# Patient Record
Sex: FEMALE | Race: WHITE | NOT HISPANIC OR LATINO | Employment: STUDENT | URBAN - METROPOLITAN AREA
[De-identification: names, ages, dates, MRNs, and addresses within clinical notes are randomized per-mention and may not be internally consistent; named-entity substitution may affect disease eponyms.]

---

## 2022-01-10 ENCOUNTER — TRANSFERRED RECORDS (OUTPATIENT)
Dept: HEALTH INFORMATION MANAGEMENT | Facility: CLINIC | Age: 22
End: 2022-01-10
Payer: COMMERCIAL

## 2022-02-07 ENCOUNTER — ANCILLARY PROCEDURE (OUTPATIENT)
Dept: ULTRASOUND IMAGING | Facility: CLINIC | Age: 22
End: 2022-02-07
Payer: COMMERCIAL

## 2022-02-07 DIAGNOSIS — R22.1 LOCALIZED SWELLING, MASS AND LUMP, NECK: ICD-10-CM

## 2022-02-07 PROCEDURE — 76536 US EXAM OF HEAD AND NECK: CPT | Mod: GC | Performed by: RADIOLOGY

## 2022-02-14 ENCOUNTER — ANCILLARY PROCEDURE (OUTPATIENT)
Dept: CT IMAGING | Facility: CLINIC | Age: 22
End: 2022-02-14
Attending: NURSE PRACTITIONER
Payer: COMMERCIAL

## 2022-02-14 DIAGNOSIS — R22.1 NECK MASS: ICD-10-CM

## 2022-02-14 PROCEDURE — 70491 CT SOFT TISSUE NECK W/DYE: CPT | Mod: GC | Performed by: RADIOLOGY

## 2022-02-14 RX ORDER — IOPAMIDOL 755 MG/ML
100 INJECTION, SOLUTION INTRAVASCULAR ONCE
Status: COMPLETED | OUTPATIENT
Start: 2022-02-14 | End: 2022-02-14

## 2022-02-14 RX ADMIN — IOPAMIDOL 100 ML: 755 INJECTION, SOLUTION INTRAVASCULAR at 16:43

## 2022-02-18 ENCOUNTER — ANCILLARY PROCEDURE (OUTPATIENT)
Dept: ULTRASOUND IMAGING | Facility: CLINIC | Age: 22
End: 2022-02-18
Attending: NURSE PRACTITIONER
Payer: COMMERCIAL

## 2022-02-18 DIAGNOSIS — E04.1 THYROID NODULE: ICD-10-CM

## 2022-02-18 PROCEDURE — 76536 US EXAM OF HEAD AND NECK: CPT | Performed by: RADIOLOGY

## 2022-02-22 ENCOUNTER — MEDICAL CORRESPONDENCE (OUTPATIENT)
Dept: HEALTH INFORMATION MANAGEMENT | Facility: CLINIC | Age: 22
End: 2022-02-22
Payer: COMMERCIAL

## 2022-03-03 ENCOUNTER — TELEPHONE (OUTPATIENT)
Dept: ENDOCRINOLOGY | Facility: CLINIC | Age: 22
End: 2022-03-03
Payer: COMMERCIAL

## 2022-03-03 NOTE — TELEPHONE ENCOUNTER
Can she  Be put in Dr clark Thyroid nodule clinic  In person  Earlier than June ? Nataliya Nieves RN on 3/3/2022 at 12:36 PM

## 2022-03-03 NOTE — TELEPHONE ENCOUNTER
Health Call Center    Phone Message    May a detailed message be left on voicemail: yes     Reason for Call: Other: Patient is being referred to be seen for Thyroid nodule. Ref by Caryn Mclena with [facility]. Patient is currently scheduled on 6/9/22 at 8:00am with Dr Mobley. Patient wants to be seen in-person. Appointment is outside th 2 week time frame per guidelines. Sending encounter to clinic for review and scheduling. Please call patient to schedule if sooner opening is needed. Of note, patient had imaging done within [facility].     Action Taken: Message routed to:  [facility] Endocrinology    Travel Screening: Not Applicable

## 2022-04-03 ENCOUNTER — HEALTH MAINTENANCE LETTER (OUTPATIENT)
Age: 22
End: 2022-04-03

## 2022-05-15 ASSESSMENT — ENCOUNTER SYMPTOMS
DECREASED CONCENTRATION: 0
ALTERED TEMPERATURE REGULATION: 1
DEPRESSION: 0
CHILLS: 0
FATIGUE: 1
POLYPHAGIA: 0
FEVER: 0
INCREASED ENERGY: 1
NIGHT SWEATS: 0
WEIGHT GAIN: 0
WEIGHT LOSS: 0
INSOMNIA: 0
HALLUCINATIONS: 0
NERVOUS/ANXIOUS: 1
PANIC: 0
POLYDIPSIA: 0
DECREASED APPETITE: 0

## 2022-05-15 NOTE — PROGRESS NOTES
Thyroid biopsy  Caitlyn Owen CMA    Dorota Tellez is a 22 year old female who is being evaluated via a billable video visit.        Endocrinology and Diabetes Clinic    Consulting provider: Caryn Mclean NP  [place] [phone]    Reason for consultation: Multinodular goiter    HPI:   Dorota Tellez is a 22 year old female with finding of thyroid nodule on CT scan done for lymphadenopathy in 2/14/2022, which was done after atypical finding in a soft tissue US of a atypical mass. .    No suspicious masses were seen on subsequent CT scan in the soft tissue of the neck      Patient concern:thyroid nodles      NL TSH in 2019    Thyroid medications: none    Symptoms of hypo- hyperthyroidism:    Exposure to radiation: no  FH of thyroid Cancer: no      Assessment:  Non-toxic multinodular goiter  FNA of 1.2cm hypoechoic nodule indicated for hypoechoic pattern and size.  Patient is without risk factors and asymptomatic  Potential outcomes including benign thyroid cancer, hypocellular and atypical were reviewed      Plan:   Fine-needle aspiration of hypoechoic nodule in right lobe    Desiree Mullins MD  Endocrinology and Diabetes  Telephone contact:  [facility] [place] [phone]  [facility] [phone]        Past Medical and Past Surgical History:  No past medical history on file.    No past surgical history on file.    Medications:   No current outpatient medications on file.       Allergies:   No Known Allergies    Social History     Tobacco Use     Smoking status: Not on file     Smokeless tobacco: Not on file   Substance Use Topics     Alcohol use: Not on file       Physical Examination:  Physical Examination:  There were no vitals taken for this visit.  There is no height or weight on file to calculate BMI.    Wt Readings from Last 4 Encounters:  No data found for Wt      General: Well appearing woman in no distress, up  and go quick  Eyes: EOMI. Sclerae and conjunctivae are clear.   HENT: No thyromegaly or mass.  Nodule could not be appreciated on exam  Lymphatic: No cervical or supraclavicular lymphadenopathy.  Cardiovascular: RRR, with normal S1+S2 and no murmurs.      Labs and Studies:   No results found for: NA, CO2, CHLORIDE, CR, TRIG, HDL, HGB, TSH      Results for orders placed in visit on 02/18/22    US Thyroid    Narrative  US THYROID 2/18/2022 8:57 AM    COMPARISON: CT 2/14/2022    HISTORY: Thyroid nodule    FINDINGS:  Thyroid parenchyma: heterogenous  The right lobe of the thyroid measures: 5.7 x 2.0 x 2.0 cm  The left lobe of the thyroid measures: 4.9 x 2.0 x 1.6 cm  The thyroid isthmus measures: 0.3 cm    Right Lobe:  Nodule 1:  Location: Superior  Size: 1.0 x 0.7 x 0.5 cm  Composition: Solid or almost completely solid (2 points)  Echogenicity: Hypoechoic (2 points)  Shape: Wider than tall (0 points)  Margin: Smooth (0 points)  Echogenic Foci: None or large comet tail artifact (0 points)  Stability: Not previously imaged  TIRADS: TR4 (4-6 points)    Nodule 2:  Location: Mid  Size: 2.0 x 1.3 x 0.6 cm  Composition: Solid or almost completely solid (2 points)  Echogenicity: Hypoechoic (2 points)  Shape: Wider than tall (0 points)  Margin: Smooth (0 points)  Echogenic Foci: None or large comet tail artifact (0 points)  Stability: Not previously imaged  TIRADS: TR4 (4-6 points)    Nodule 3/4:  Inferior to the right thyroid gland hypoechoic nodules measuring 1.2 x  0.9 x 0.7 cm and 0.9 x 0.8 x 0.8 cm, likely lymph nodes.    Left Lobe:    Nodule 5:  Posterior to the left thyroid gland is a 1.2 x 0.9 x 0.5 cm hypoechoic  nodule likely representing a lymph node versus parathyroid gland.    Nodule 6/7:  Inferior to the left thyroid gland are hypoechoic nodules measuring  1.4 x 1.2 x 0.7 cm and 1.7 x 1.5 x 0.9 cm, likely representing lymph  nodes.    Impression  Impression:  1.  Heterogeneous echotexture of the thyroid gland is  nonspecific, but  can be seen with thyroiditis.  2.  Right thyroid nodules as described above. Fine-needle aspiration  may be considered for nodule #2.  3.  Several hypoechoic nodules around the thyroid likely represent  lymph nodes.    ACR TI-RADS recommendations  TR2 (2 points) & TR1 (0 points) -No FNA or follow-up  TR3 (3 points) - FNA if ? 2.5cm, follow-up if 1.5 -2.4 cm in 1, 3 and  5 years  TR4 (4-6 points) - FNA if ? 1.5cm, follow-up if 1 -1.4 cm in 1, 2, 3  and 5 years  TR5 (?7 points) - FNA if ? 1cm, follow-up if 0.5 -0.9 cm every year  for 5 years    I have personally reviewed the examination and initial interpretation  and I agree with the findings.    EMMIE OSCAR MD    Results for orders placed in visit on 02/14/22    CT Soft Tissue Neck w Contrast    Narrative  CT SOFT TISSUE NECK W CONTRAST 2/14/2022 4:55 PM    History:  lymphadenopathy; Neck mass  ICD-10: Neck mass    Comparison:  None    Technique: Following intravenous administration of nonionic iodinated  contrast medium, thin section helical CT images were obtained from the  skull base down to the level of the aortic arch.  Axial, coronal and  sagittal reformations were performed with 2-3 mm slice thickness  reconstruction. Images were reviewed in soft tissue, lung and bone  windows.    Contrast: Isovue 370  100cc    Findings:  No suspicious mass in the area of marker. There is a normal-sized left  submandibular node. Additional scattered normal-sized cervical lymph  nodes.  Evaluation of the mucosal space demonstrates no evident abnormality in  the nasopharynx, oropharynx, hypopharynx or the glottis. The tongue  base appears normal.  The major salivary glands appear unremarkable.  Multinodular thyroid gland is noted.  The major vascular structures in the neck appear unremarkable.    Evaluation of the osseous structures demonstrate no worrisome lytic or  sclerotic lesion. . No overt spinal canal or neuroforaminal stenosis.  Mild polypoid mucosal  thickening of the right maxillary sinus. The  remainder of the visualized paranasal sinuses are clear.. The  visualized mastoid air cells are clear.. Orbits appear unremarkable.    The visualized lung apices are clear.    Impression  Impression:  1. Multinodular thyroid gland. Further evaluation with ultrasound is  recommended.    2. No suspicious mass in the neck.    I have personally reviewed the examination and initial interpretation  and I agree with the findings.    CANDICE CUI MD      SYSTEM ID:  [id number]        Answers for HPI/ROS submitted by the patient on 5/15/2022  General Symptoms: Yes  Skin Symptoms: No  HENT Symptoms: No  EYE SYMPTOMS: No  HEART SYMPTOMS: No  LUNG SYMPTOMS: No  INTESTINAL SYMPTOMS: No  URINARY SYMPTOMS: No  GYNECOLOGIC SYMPTOMS: No  BREAST SYMPTOMS: No  SKELETAL SYMPTOMS: No  BLOOD SYMPTOMS: No  NERVOUS SYSTEM SYMPTOMS: No  MENTAL HEALTH SYMPTOMS: Yes  Fever: No  Loss of appetite: No  Weight loss: No  Weight gain: No  Fatigue: Yes  Night sweats: No  Chills: No  Increased stress: Yes  Excessive hunger: No  Excessive thirst: No  Feeling hot or cold when others believe the temperature is normal: Yes  Loss of height: No  Post-operative complications: No  Surgical site pain: No  Hallucinations: No  Change in or Loss of Energy: Yes  Hyperactivity: No  Confusion: No  Nervous or Anxious: Yes  Depression: No  Trouble sleeping: No  Trouble thinking or concentrating: No  Mood changes: No  Panic attacks: No

## 2022-05-18 ENCOUNTER — OFFICE VISIT (OUTPATIENT)
Dept: ENDOCRINOLOGY | Facility: CLINIC | Age: 22
End: 2022-05-18
Payer: COMMERCIAL

## 2022-05-18 VITALS
SYSTOLIC BLOOD PRESSURE: 107 MMHG | DIASTOLIC BLOOD PRESSURE: 73 MMHG | TEMPERATURE: 98.6 F | HEART RATE: 87 BPM | BODY MASS INDEX: 21.39 KG/M2 | WEIGHT: 128.4 LBS | HEIGHT: 65 IN

## 2022-05-18 DIAGNOSIS — E04.2 NON-TOXIC MULTINODULAR GOITER: Primary | ICD-10-CM

## 2022-05-18 PROCEDURE — 88173 CYTOPATH EVAL FNA REPORT: CPT | Mod: 26 | Performed by: PATHOLOGY

## 2022-05-18 PROCEDURE — 88173 CYTOPATH EVAL FNA REPORT: CPT | Mod: TC | Performed by: INTERNAL MEDICINE

## 2022-05-18 PROCEDURE — 99203 OFFICE O/P NEW LOW 30 MIN: CPT | Performed by: INTERNAL MEDICINE

## 2022-05-18 RX ORDER — NORGESTREL-ETHINYL ESTRADIOL 0.3-0.03MG
TABLET ORAL
COMMUNITY
Start: 2022-04-14

## 2022-05-18 RX ORDER — LAMOTRIGINE 25 MG/1
75 TABLET ORAL
COMMUNITY
Start: 2022-01-10

## 2022-05-18 RX ORDER — AMLODIPINE BESYLATE 5 MG/1
5 TABLET ORAL
COMMUNITY
Start: 2022-01-10

## 2022-05-18 RX ORDER — SERTRALINE HYDROCHLORIDE 100 MG/1
100 TABLET, FILM COATED ORAL
COMMUNITY
Start: 2022-01-10

## 2022-05-18 RX ORDER — FERROUS SULFATE 325(65) MG
325 TABLET ORAL
COMMUNITY

## 2022-05-18 RX ORDER — CEFUROXIME AXETIL 500 MG/1
250 TABLET ORAL
COMMUNITY
Start: 2022-01-10

## 2022-05-18 RX ORDER — CLINDAMYCIN PHOSPHATE 10 MG/ML
1 SOLUTION TOPICAL
COMMUNITY
Start: 2020-08-10

## 2022-05-18 ASSESSMENT — PAIN SCALES - GENERAL: PAINLEVEL: NO PAIN (0)

## 2022-05-18 NOTE — PROCEDURES
US guided FNAB protocol  Indication: 1.2 cm hypoechoic nodule mid right lobe    Referring physician: Dr. Caryn Mclean    Following informed consent, pause for the cause and the sterile preparation, FNA biopsy was performed using 25 gauge needles and ultrasound guidance for needle placement. Total of 4 passes were made by  Gaetano  . Samples were submitted for cytology, 1/2 air dried and 1/2 in 95% ETOH and the needle wash without complications.  Sample for Afirma was obtained.  After care instructions were provided.     Impression: uncomplicated fine needle aspiration biopsy of right thyroid nodule under ultrasound guidance    Patient was discharged home, there was no bleed, no bruits    Operators: Desiree Mullins MD  Endocrinology and Diabetes  Telephone contact:  [facility] [place] [phone]  [facility] [phone]

## 2022-05-18 NOTE — LETTER
5/18/2022       RE: Dorota Tellez  [address] [facility] [address]     Dear Colleague,    Thank you for referring your patient, Dorota Tellez, to the [facility] [place] at [facility]. Please see a copy of my visit note below.    Thyroid biopsy  Caitlyn Owen CMA    Dorota Tellez is a 22 year old female who is being evaluated via a billable video visit.        Endocrinology and Diabetes Clinic    Consulting provider: Caryn Mclean NP  [facility]  [address] [place] [address]    Reason for consultation: Multinodular goiter    HPI:   Dorota Tellez is a 22 year old female with finding of thyroid nodule on CT scan done for lymphadenopathy in 2/14/2022, which was done after atypical finding in a soft tissue US of a atypical mass. .    No suspicious masses were seen on subsequent CT scan in the soft tissue of the neck      Patient concern:thyroid nodles      NL TSH in 2019    Thyroid medications: none    Symptoms of hypo- hyperthyroidism:    Exposure to radiation: no  FH of thyroid Cancer: no      Assessment:  Non-toxic multinodular goiter  FNA of 1.2cm hypoechoic nodule indicated for hypoechoic pattern and size.  Patient is without risk factors and asymptomatic  Potential outcomes including benign thyroid cancer, hypocellular and atypical were reviewed      Plan:   Fine-needle aspiration of hypoechoic nodule in right lobe    Desiree Mullins MD  Endocrinology and Diabetes  Telephone contact:  [facility] [place] [phone]  [facility] [phone]        Past Medical and Past Surgical History:  No past medical history on file.    No past surgical history on file.    Medications:   No current outpatient medications on file.       Allergies:   No Known Allergies    Social History     Tobacco Use     Smoking status: Not on file     Smokeless tobacco: Not on file    Substance Use Topics     Alcohol use: Not on file       Physical Examination:  Physical Examination:  There were no vitals taken for this visit.  There is no height or weight on file to calculate BMI.    Wt Readings from Last 4 Encounters:  No data found for Wt      General: Well appearing woman in no distress, up and go quick  Eyes: EOMI. Sclerae and conjunctivae are clear.   HENT: No thyromegaly or mass.  Nodule could not be appreciated on exam  Lymphatic: No cervical or supraclavicular lymphadenopathy.  Cardiovascular: RRR, with normal S1+S2 and no murmurs.      Labs and Studies:   No results found for: NA, CO2, CHLORIDE, CR, TRIG, HDL, HGB, TSH      Results for orders placed in visit on 02/18/22    US Thyroid    Narrative  US THYROID 2/18/2022 8:57 AM    COMPARISON: CT 2/14/2022    HISTORY: Thyroid nodule    FINDINGS:  Thyroid parenchyma: heterogenous  The right lobe of the thyroid measures: 5.7 x 2.0 x 2.0 cm  The left lobe of the thyroid measures: 4.9 x 2.0 x 1.6 cm  The thyroid isthmus measures: 0.3 cm    Right Lobe:  Nodule 1:  Location: Superior  Size: 1.0 x 0.7 x 0.5 cm  Composition: Solid or almost completely solid (2 points)  Echogenicity: Hypoechoic (2 points)  Shape: Wider than tall (0 points)  Margin: Smooth (0 points)  Echogenic Foci: None or large comet tail artifact (0 points)  Stability: Not previously imaged  TIRADS: TR4 (4-6 points)    Nodule 2:  Location: Mid  Size: 2.0 x 1.3 x 0.6 cm  Composition: Solid or almost completely solid (2 points)  Echogenicity: Hypoechoic (2 points)  Shape: Wider than tall (0 points)  Margin: Smooth (0 points)  Echogenic Foci: None or large comet tail artifact (0 points)  Stability: Not previously imaged  TIRADS: TR4 (4-6 points)    Nodule 3/4:  Inferior to the right thyroid gland hypoechoic nodules measuring 1.2 x  0.9 x 0.7 cm and 0.9 x 0.8 x 0.8 cm, likely lymph nodes.    Left Lobe:    Nodule 5:  Posterior to the left thyroid gland is a 1.2 x 0.9 x 0.5 cm  hypoechoic  nodule likely representing a lymph node versus parathyroid gland.    Nodule 6/7:  Inferior to the left thyroid gland are hypoechoic nodules measuring  1.4 x 1.2 x 0.7 cm and 1.7 x 1.5 x 0.9 cm, likely representing lymph  nodes.    Impression  Impression:  1.  Heterogeneous echotexture of the thyroid gland is nonspecific, but  can be seen with thyroiditis.  2.  Right thyroid nodules as described above. Fine-needle aspiration  may be considered for nodule #2.  3.  Several hypoechoic nodules around the thyroid likely represent  lymph nodes.    ACR TI-RADS recommendations  TR2 (2 points) & TR1 (0 points) -No FNA or follow-up  TR3 (3 points) - FNA if ? 2.5cm, follow-up if 1.5 -2.4 cm in 1, 3 and  5 years  TR4 (4-6 points) - FNA if ? 1.5cm, follow-up if 1 -1.4 cm in 1, 2, 3  and 5 years  TR5 (?7 points) - FNA if ? 1cm, follow-up if 0.5 -0.9 cm every year  for 5 years    I have personally reviewed the examination and initial interpretation  and I agree with the findings.    EMMIE OSCAR MD    Results for orders placed in visit on 02/14/22    CT Soft Tissue Neck w Contrast    Narrative  CT SOFT TISSUE NECK W CONTRAST 2/14/2022 4:55 PM    History:  lymphadenopathy; Neck mass  ICD-10: Neck mass    Comparison:  None    Technique: Following intravenous administration of nonionic iodinated  contrast medium, thin section helical CT images were obtained from the  skull base down to the level of the aortic arch.  Axial, coronal and  sagittal reformations were performed with 2-3 mm slice thickness  reconstruction. Images were reviewed in soft tissue, lung and bone  windows.    Contrast: Isovue 370  100cc    Findings:  No suspicious mass in the area of marker. There is a normal-sized left  submandibular node. Additional scattered normal-sized cervical lymph  nodes.  Evaluation of the mucosal space demonstrates no evident abnormality in  the nasopharynx, oropharynx, hypopharynx or the glottis. The tongue  base appears  normal.  The major salivary glands appear unremarkable.  Multinodular thyroid gland is noted.  The major vascular structures in the neck appear unremarkable.    Evaluation of the osseous structures demonstrate no worrisome lytic or  sclerotic lesion. . No overt spinal canal or neuroforaminal stenosis.  Mild polypoid mucosal thickening of the right maxillary sinus. The  remainder of the visualized paranasal sinuses are clear.. The  visualized mastoid air cells are clear.. Orbits appear unremarkable.    The visualized lung apices are clear.    Impression  Impression:  1. Multinodular thyroid gland. Further evaluation with ultrasound is  recommended.    2. No suspicious mass in the neck.    I have personally reviewed the examination and initial interpretation  and I agree with the findings.    CANDICE CUI MD      SYSTEM ID:  [id number]        Answers for HPI/ROS submitted by the patient on 5/15/2022  General Symptoms: Yes  Skin Symptoms: No  HENT Symptoms: No  EYE SYMPTOMS: No  HEART SYMPTOMS: No  LUNG SYMPTOMS: No  INTESTINAL SYMPTOMS: No  URINARY SYMPTOMS: No  GYNECOLOGIC SYMPTOMS: No  BREAST SYMPTOMS: No  SKELETAL SYMPTOMS: No  BLOOD SYMPTOMS: No  NERVOUS SYSTEM SYMPTOMS: No  MENTAL HEALTH SYMPTOMS: Yes  Fever: No  Loss of appetite: No  Weight loss: No  Weight gain: No  Fatigue: Yes  Night sweats: No  Chills: No  Increased stress: Yes  Excessive hunger: No  Excessive thirst: No  Feeling hot or cold when others believe the temperature is normal: Yes  Loss of height: No  Post-operative complications: No  Surgical site pain: No  Hallucinations: No  Change in or Loss of Energy: Yes  Hyperactivity: No  Confusion: No  Nervous or Anxious: Yes  Depression: No  Trouble sleeping: No  Trouble thinking or concentrating: No  Mood changes: No  Panic attacks: No

## 2022-05-19 LAB
PATH REPORT.COMMENTS IMP SPEC: NORMAL
PATH REPORT.COMMENTS IMP SPEC: NORMAL
PATH REPORT.FINAL DX SPEC: NORMAL
PATH REPORT.GROSS SPEC: NORMAL
PATH REPORT.MICROSCOPIC SPEC OTHER STN: NORMAL
PATH REPORT.RELEVANT HX SPEC: NORMAL

## 2022-10-03 ENCOUNTER — HEALTH MAINTENANCE LETTER (OUTPATIENT)
Age: 22
End: 2022-10-03

## 2023-02-12 ENCOUNTER — HEALTH MAINTENANCE LETTER (OUTPATIENT)
Age: 23
End: 2023-02-12

## 2023-06-21 ENCOUNTER — OFFICE VISIT (OUTPATIENT)
Dept: ENDOCRINOLOGY | Facility: CLINIC | Age: 23
End: 2023-06-21
Payer: COMMERCIAL

## 2023-06-21 VITALS
HEART RATE: 75 BPM | SYSTOLIC BLOOD PRESSURE: 99 MMHG | OXYGEN SATURATION: 97 % | DIASTOLIC BLOOD PRESSURE: 61 MMHG | WEIGHT: 137 LBS | BODY MASS INDEX: 22.8 KG/M2

## 2023-06-21 DIAGNOSIS — E04.2 NON-TOXIC MULTINODULAR GOITER: Primary | ICD-10-CM

## 2023-06-21 PROCEDURE — 99214 OFFICE O/P EST MOD 30 MIN: CPT | Performed by: INTERNAL MEDICINE

## 2023-06-21 ASSESSMENT — PAIN SCALES - GENERAL: PAINLEVEL: NO PAIN (0)

## 2023-06-21 NOTE — LETTER
6/21/2023       RE: Dorota Tellez  [address] [facility] [address]     Dear Colleague,    Thank you for referring your patient, Dorota Tellez, to the [facility] ENDOCRINOLOGY CLINIC [place] at [facility]. Please see a copy of my visit note below.    Endocrinology and Diabetes Clinic      Follow-up for nontoxic multinodular goiter      Assessment:  Non-toxic multinodular goiter  FNA of 1.2cm hypoechoic nodule shows Hashimoto's thyroiditis   Patient is without risk factors and asymptomatic  Patient is clinically euthyroid  Symptoms of hypo and hyperthyroidism were reviewed, importance of checking thyroid hormone functions during pregnancy or even during pregnancy planning was discussed    Plan:   Thyroid ultrasound repeat soon  Check TSH reflex free T4 once a year or with symptoms    Desiree Mullins MD  Endocrinology and Diabetes  Telephone contact:  [facility] [place] [phone]  [facility] [phone]          Interval history  Patient remains without symptoms in the anterior neck including no dysphagia dysphonia no pain    Patient notes mild weight gain, however otherwise denies any symptoms including heart racing palpitations tremors change in bowel habits hair loss                  Past Medical and Past Surgical History:  No past medical history on file.    No past surgical history on file.    Medications:   Current Outpatient Medications   Medication Sig Dispense Refill    amLODIPine (NORVASC) 5 MG tablet Take 5 mg by mouth      cefuroxime (CEFTIN) 500 MG tablet Take 250 mg by mouth      clindamycin (CLEOCIN T) 1 % SWAB Apply 1 Dose topically      CRYSELLE-28 0.3-30 MG-MCG tablet       ferrous sulfate (FEROSUL) 325 (65 Fe) MG tablet Take 325 mg by mouth      lamoTRIgine (LAMICTAL) 25 MG tablet Take 75 mg by mouth      norgestrel-ethinyl estradiol (LO/OVRAL) 0.3-30 MG-MCG tablet        sertraline (ZOLOFT) 100 MG tablet Take 100 mg by mouth         Allergies:   No Known Allergies    Social History     Tobacco Use    Smoking status: Never    Smokeless tobacco: Never   Substance Use Topics    Alcohol use: Yes     Comment: Occassionally       Physical Examination:  Physical Examination:  There were no vitals taken for this visit.  There is no height or weight on file to calculate BMI.    Wt Readings from Last 4 Encounters:  No data found for Wt      General: Well appearing woman in no distress, up and go quick  Eyes: EOMI. Sclerae and conjunctivae are clear.   HENT: No thyromegaly or mass.  Nodule could not be appreciated on exam  Lymphatic: No cervical or supraclavicular lymphadenopathy.  Cardiovascular: RRR, with normal S1+S2 and no murmurs.      Labs and Studies:         No results found for: NA, CO2, CHLORIDE, CR, TRIG, HDL, HGB, TSH          US Thyroid    Narrative  US THYROID 2/18/2022 8:57 AM    COMPARISON: CT 2/14/2022    HISTORY: Thyroid nodule    FINDINGS:  Thyroid parenchyma: heterogenous  The right lobe of the thyroid measures: 5.7 x 2.0 x 2.0 cm  The left lobe of the thyroid measures: 4.9 x 2.0 x 1.6 cm  The thyroid isthmus measures: 0.3 cm    Right Lobe:  Nodule 1:  Location: Superior  Size: 1.0 x 0.7 x 0.5 cm  Composition: Solid or almost completely solid (2 points)  Echogenicity: Hypoechoic (2 points)  Shape: Wider than tall (0 points)  Margin: Smooth (0 points)  Echogenic Foci: None or large comet tail artifact (0 points)  Stability: Not previously imaged  TIRADS: TR4 (4-6 points)    Nodule 2:  Location: Mid  Size: 2.0 x 1.3 x 0.6 cm  Composition: Solid or almost completely solid (2 points)  Echogenicity: Hypoechoic (2 points)  Shape: Wider than tall (0 points)  Margin: Smooth (0 points)  Echogenic Foci: None or large comet tail artifact (0 points)  Stability: Not previously imaged  TIRADS: TR4 (4-6 points)    Nodule 3/4:  Inferior to the right thyroid gland hypoechoic nodules  measuring 1.2 x  0.9 x 0.7 cm and 0.9 x 0.8 x 0.8 cm, likely lymph nodes.    Left Lobe:    Nodule 5:  Posterior to the left thyroid gland is a 1.2 x 0.9 x 0.5 cm hypoechoic  nodule likely representing a lymph node versus parathyroid gland.    Nodule 6/7:  Inferior to the left thyroid gland are hypoechoic nodules measuring  1.4 x 1.2 x 0.7 cm and 1.7 x 1.5 x 0.9 cm, likely representing lymph  nodes.    Impression  Impression:  1.  Heterogeneous echotexture of the thyroid gland is nonspecific, but  can be seen with thyroiditis.  2.  Right thyroid nodules as described above. Fine-needle aspiration  may be considered for nodule #2.  3.  Several hypoechoic nodules around the thyroid likely represent  lymph nodes.    ACR TI-RADS recommendations  TR2 (2 points) & TR1 (0 points) -No FNA or follow-up  TR3 (3 points) - FNA if ? 2.5cm, follow-up if 1.5 -2.4 cm in 1, 3 and  5 years  TR4 (4-6 points) - FNA if ? 1.5cm, follow-up if 1 -1.4 cm in 1, 2, 3  and 5 years  TR5 (?7 points) - FNA if ? 1cm, follow-up if 0.5 -0.9 cm every year  for 5 years    I have personally reviewed the examination and initial interpretation  and I agree with the findings.    EMMIE OSCAR MD    Results for orders placed in visit on 02/14/22    CT Soft Tissue Neck w Contrast    Narrative  CT SOFT TISSUE NECK W CONTRAST 2/14/2022 4:55 PM    History:  lymphadenopathy; Neck mass  ICD-10: Neck mass    Comparison:  None    Technique: Following intravenous administration of nonionic iodinated  contrast medium, thin section helical CT images were obtained from the  skull base down to the level of the aortic arch.  Axial, coronal and  sagittal reformations were performed with 2-3 mm slice thickness  reconstruction. Images were reviewed in soft tissue, lung and bone  windows.    Contrast: Isovue 370  100cc    Findings:  No suspicious mass in the area of marker. There is a normal-sized left  submandibular node. Additional scattered normal-sized cervical  lymph  nodes.  Evaluation of the mucosal space demonstrates no evident abnormality in  the nasopharynx, oropharynx, hypopharynx or the glottis. The tongue  base appears normal.  The major salivary glands appear unremarkable.  Multinodular thyroid gland is noted.  The major vascular structures in the neck appear unremarkable.    Evaluation of the osseous structures demonstrate no worrisome lytic or  sclerotic lesion. . No overt spinal canal or neuroforaminal stenosis.  Mild polypoid mucosal thickening of the right maxillary sinus. The  remainder of the visualized paranasal sinuses are clear.. The  visualized mastoid air cells are clear.. Orbits appear unremarkable.    The visualized lung apices are clear.    Impression  Impression:  1. Multinodular thyroid gland. Further evaluation with ultrasound is  recommended.    2. No suspicious mass in the neck.    I have personally reviewed the examination and initial interpretation  and I agree with the findings.    CANDICE CUI MD      SYSTEM ID:  [id number]      HPI:   Dorota Tellez is a young female with finding of thyroid nodule on CT scan done for lymphadenopathy in 2/14/2022, which was done after atypical finding in a soft tissue US of a atypical mass. .    No suspicious masses were seen on subsequent CT scan in the soft tissue of the neck    Thyroid medications: none    Exposure to radiation: no  FH of thyroid Cancer: no

## 2023-06-21 NOTE — PROGRESS NOTES
[facility]      Follow-up for nontoxic multinodular goiter      Assessment:  Non-toxic multinodular goiter  FNA of 1.2cm hypoechoic nodule shows Hashimoto's thyroiditis   Patient is without risk factors and asymptomatic  Patient is clinically euthyroid  Symptoms of hypo and hyperthyroidism were reviewed, importance of checking thyroid hormone functions during pregnancy or even during pregnancy planning was discussed    Plan:   Thyroid ultrasound repeat soon  Check TSH reflex free T4 once a year or with symptoms    Desiree Mullins MD  Endocrinology and Diabetes  Telephone contact:  [facility] [phone]  [facility] [phone]          Interval history  Patient remains without symptoms in the anterior neck including no dysphagia dysphonia no pain    Patient notes mild weight gain, however otherwise denies any symptoms including heart racing palpitations tremors change in bowel habits hair loss                  Past Medical and Past Surgical History:  No past medical history on file.    No past surgical history on file.    Medications:   Current Outpatient Medications   Medication Sig Dispense Refill     amLODIPine (NORVASC) 5 MG tablet Take 5 mg by mouth       cefuroxime (CEFTIN) 500 MG tablet Take 250 mg by mouth       clindamycin (CLEOCIN T) 1 % SWAB Apply 1 Dose topically       CRYSELLE-28 0.3-30 MG-MCG tablet        ferrous sulfate (FEROSUL) 325 (65 Fe) MG tablet Take 325 mg by mouth       lamoTRIgine (LAMICTAL) 25 MG tablet Take 75 mg by mouth       norgestrel-ethinyl estradiol (LO/OVRAL) 0.3-30 MG-MCG tablet        sertraline (ZOLOFT) 100 MG tablet Take 100 mg by mouth         Allergies:   No Known Allergies    Social History     Tobacco Use     Smoking status: Never     Smokeless tobacco: Never   Substance Use Topics     Alcohol use: Yes     Comment: Occassionally       Physical Examination:  Physical Examination:  There were no  vitals taken for this visit.  There is no height or weight on file to calculate BMI.    Wt Readings from Last 4 Encounters:  No data found for Wt      General: Well appearing woman in no distress, up and go quick  Eyes: EOMI. Sclerae and conjunctivae are clear.   HENT: No thyromegaly or mass.  Nodule could not be appreciated on exam  Lymphatic: No cervical or supraclavicular lymphadenopathy.  Cardiovascular: RRR, with normal S1+S2 and no murmurs.      Labs and Studies:         No results found for: NA, CO2, CHLORIDE, CR, TRIG, HDL, HGB, TSH          US Thyroid    Narrative  US THYROID 2/18/2022 8:57 AM    COMPARISON: CT 2/14/2022    HISTORY: Thyroid nodule    FINDINGS:  Thyroid parenchyma: heterogenous  The right lobe of the thyroid measures: 5.7 x 2.0 x 2.0 cm  The left lobe of the thyroid measures: 4.9 x 2.0 x 1.6 cm  The thyroid isthmus measures: 0.3 cm    Right Lobe:  Nodule 1:  Location: Superior  Size: 1.0 x 0.7 x 0.5 cm  Composition: Solid or almost completely solid (2 points)  Echogenicity: Hypoechoic (2 points)  Shape: Wider than tall (0 points)  Margin: Smooth (0 points)  Echogenic Foci: None or large comet tail artifact (0 points)  Stability: Not previously imaged  TIRADS: TR4 (4-6 points)    Nodule 2:  Location: Mid  Size: 2.0 x 1.3 x 0.6 cm  Composition: Solid or almost completely solid (2 points)  Echogenicity: Hypoechoic (2 points)  Shape: Wider than tall (0 points)  Margin: Smooth (0 points)  Echogenic Foci: None or large comet tail artifact (0 points)  Stability: Not previously imaged  TIRADS: TR4 (4-6 points)    Nodule 3/4:  Inferior to the right thyroid gland hypoechoic nodules measuring 1.2 x  0.9 x 0.7 cm and 0.9 x 0.8 x 0.8 cm, likely lymph nodes.    Left Lobe:    Nodule 5:  Posterior to the left thyroid gland is a 1.2 x 0.9 x 0.5 cm hypoechoic  nodule likely representing a lymph node versus parathyroid gland.    Nodule 6/7:  Inferior to the left thyroid gland are hypoechoic nodules  measuring  1.4 x 1.2 x 0.7 cm and 1.7 x 1.5 x 0.9 cm, likely representing lymph  nodes.    Impression  Impression:  1.  Heterogeneous echotexture of the thyroid gland is nonspecific, but  can be seen with thyroiditis.  2.  Right thyroid nodules as described above. Fine-needle aspiration  may be considered for nodule #2.  3.  Several hypoechoic nodules around the thyroid likely represent  lymph nodes.    ACR TI-RADS recommendations  TR2 (2 points) & TR1 (0 points) -No FNA or follow-up  TR3 (3 points) - FNA if ? 2.5cm, follow-up if 1.5 -2.4 cm in 1, 3 and  5 years  TR4 (4-6 points) - FNA if ? 1.5cm, follow-up if 1 -1.4 cm in 1, 2, 3  and 5 years  TR5 (?7 points) - FNA if ? 1cm, follow-up if 0.5 -0.9 cm every year  for 5 years    I have personally reviewed the examination and initial interpretation  and I agree with the findings.    EMMIE OSCAR MD    Results for orders placed in visit on 02/14/22    CT Soft Tissue Neck w Contrast    Narrative  CT SOFT TISSUE NECK W CONTRAST 2/14/2022 4:55 PM    History:  lymphadenopathy; Neck mass  ICD-10: Neck mass    Comparison:  None    Technique: Following intravenous administration of nonionic iodinated  contrast medium, thin section helical CT images were obtained from the  skull base down to the level of the aortic arch.  Axial, coronal and  sagittal reformations were performed with 2-3 mm slice thickness  reconstruction. Images were reviewed in soft tissue, lung and bone  windows.    Contrast: Isovue 370  100cc    Findings:  No suspicious mass in the area of marker. There is a normal-sized left  submandibular node. Additional scattered normal-sized cervical lymph  nodes.  Evaluation of the mucosal space demonstrates no evident abnormality in  the nasopharynx, oropharynx, hypopharynx or the glottis. The tongue  base appears normal.  The major salivary glands appear unremarkable.  Multinodular thyroid gland is noted.  The major vascular structures in the neck appear  unremarkable.    Evaluation of the osseous structures demonstrate no worrisome lytic or  sclerotic lesion. . No overt spinal canal or neuroforaminal stenosis.  Mild polypoid mucosal thickening of the right maxillary sinus. The  remainder of the visualized paranasal sinuses are clear.. The  visualized mastoid air cells are clear.. Orbits appear unremarkable.    The visualized lung apices are clear.    Impression  Impression:  1. Multinodular thyroid gland. Further evaluation with ultrasound is  recommended.    2. No suspicious mass in the neck.    I have personally reviewed the examination and initial interpretation  and I agree with the findings.    CANDICE CUI MD      SYSTEM ID:  [id number]      HPI:   Dorota Tellez is a young female with finding of thyroid nodule on CT scan done for lymphadenopathy in 2/14/2022, which was done after atypical finding in a soft tissue US of a atypical mass. .    No suspicious masses were seen on subsequent CT scan in the soft tissue of the neck    Thyroid medications: none    Exposure to radiation: no  FH of thyroid Cancer: no

## 2023-06-21 NOTE — PATIENT INSTRUCTIONS
Thyroid ultrasound soon      Do lab check for thyroid hormone levels once a year with your annual exam    Please call and schedule at one of these locations that provide the service you need.     DEXA, CT, MRI, US, XRAY    [facility]   ([facility]) [phone]   [facility] ([place], [place]) [phone]   [facility] ([facility]) [phone]   [facility] ([facility]) [phone]

## 2024-01-30 ENCOUNTER — TRANSCRIBE ORDERS (OUTPATIENT)
Dept: OTHER | Age: 24
End: 2024-01-30

## 2024-01-30 DIAGNOSIS — M25.561 ACUTE PAIN OF RIGHT KNEE: Primary | ICD-10-CM

## 2024-02-18 ASSESSMENT — ACTIVITIES OF DAILY LIVING (ADL)
PLEASE_INDICATE_YOR_PRIMARY_REASON_FOR_REFERRAL_TO_THERAPY:: KNEE
WEAKNESS: I DO NOT HAVE THE SYMPTOM
STIFFNESS: THE SYMPTOM AFFECTS MY ACTIVITY SLIGHTLY
GO UP STAIRS: ACTIVITY IS MINIMALLY DIFFICULT
GO DOWN STAIRS: ACTIVITY IS MINIMALLY DIFFICULT
RISE FROM A CHAIR: ACTIVITY IS NOT DIFFICULT
KNEE_ACTIVITY_OF_DAILY_LIVING_SCORE: 88.57
PAIN: THE SYMPTOM AFFECTS MY ACTIVITY SLIGHTLY
SWELLING: I DO NOT HAVE THE SYMPTOM
HOW_WOULD_YOU_RATE_THE_OVERALL_FUNCTION_OF_YOUR_KNEE_DURING_YOUR_USUAL_DAILY_ACTIVITIES?: ABNORMAL
LIMPING: I DO NOT HAVE THE SYMPTOM
GO DOWN STAIRS: ACTIVITY IS MINIMALLY DIFFICULT
WEAKNESS: I DO NOT HAVE THE SYMPTOM
WALK: ACTIVITY IS NOT DIFFICULT
GIVING WAY, BUCKLING OR SHIFTING OF KNEE: I HAVE THE SYMPTOM BUT IT DOES NOT AFFECT MY ACTIVITY
STIFFNESS: THE SYMPTOM AFFECTS MY ACTIVITY SLIGHTLY
WALK: ACTIVITY IS NOT DIFFICULT
HOW_WOULD_YOU_RATE_THE_OVERALL_FUNCTION_OF_YOUR_KNEE_DURING_YOUR_USUAL_DAILY_ACTIVITIES?: ABNORMAL
SIT WITH YOUR KNEE BENT: ACTIVITY IS NOT DIFFICULT
STAND: ACTIVITY IS NOT DIFFICULT
HOW_WOULD_YOU_RATE_THE_CURRENT_FUNCTION_OF_YOUR_KNEE_DURING_YOUR_USUAL_DAILY_ACTIVITIES_ON_A_SCALE_FROM_0_TO_100_WITH_100_BEING_YOUR_LEVEL_OF_KNEE_FUNCTION_PRIOR_TO_YOUR_INJURY_AND_0_BEING_THE_INABILITY_TO_PERFORM_ANY_OF_YOUR_USUAL_DAILY_ACTIVITIES?: 80
AS_A_RESULT_OF_YOUR_KNEE_INJURY,_HOW_WOULD_YOU_RATE_YOUR_CURRENT_LEVEL_OF_DAILY_ACTIVITY?: NEARLY NORMAL
GO UP STAIRS: ACTIVITY IS MINIMALLY DIFFICULT
KNEEL ON THE FRONT OF YOUR KNEE: ACTIVITY IS MINIMALLY DIFFICULT
SWELLING: I DO NOT HAVE THE SYMPTOM
LIMPING: I DO NOT HAVE THE SYMPTOM
SQUAT: ACTIVITY IS NOT DIFFICULT
RISE FROM A CHAIR: ACTIVITY IS NOT DIFFICULT
SIT WITH YOUR KNEE BENT: ACTIVITY IS NOT DIFFICULT
SQUAT: ACTIVITY IS NOT DIFFICULT
KNEEL ON THE FRONT OF YOUR KNEE: ACTIVITY IS MINIMALLY DIFFICULT
AS_A_RESULT_OF_YOUR_KNEE_INJURY,_HOW_WOULD_YOU_RATE_YOUR_CURRENT_LEVEL_OF_DAILY_ACTIVITY?: NEARLY NORMAL
HOW_WOULD_YOU_RATE_THE_CURRENT_FUNCTION_OF_YOUR_KNEE_DURING_YOUR_USUAL_DAILY_ACTIVITIES_ON_A_SCALE_FROM_0_TO_100_WITH_100_BEING_YOUR_LEVEL_OF_KNEE_FUNCTION_PRIOR_TO_YOUR_INJURY_AND_0_BEING_THE_INABILITY_TO_PERFORM_ANY_OF_YOUR_USUAL_DAILY_ACTIVITIES?: 80
RAW_SCORE: 62
STAND: ACTIVITY IS NOT DIFFICULT
PAIN: THE SYMPTOM AFFECTS MY ACTIVITY SLIGHTLY
GIVING WAY, BUCKLING OR SHIFTING OF KNEE: I HAVE THE SYMPTOM BUT IT DOES NOT AFFECT MY ACTIVITY
KNEE_ACTIVITY_OF_DAILY_LIVING_SUM: 62

## 2024-02-23 ENCOUNTER — THERAPY VISIT (OUTPATIENT)
Dept: PHYSICAL THERAPY | Facility: CLINIC | Age: 24
End: 2024-02-23
Payer: COMMERCIAL

## 2024-02-23 DIAGNOSIS — M25.561 ACUTE PAIN OF RIGHT KNEE: ICD-10-CM

## 2024-02-23 PROCEDURE — 97161 PT EVAL LOW COMPLEX 20 MIN: CPT | Mod: GP

## 2024-02-23 PROCEDURE — 97110 THERAPEUTIC EXERCISES: CPT | Mod: GP

## 2024-02-23 NOTE — PROGRESS NOTES
PHYSICAL THERAPY EVALUATION  Type of Visit: Evaluation    See electronic medical record for Abuse and Falls Screening details.    Subjective   Pt is a 25 yo female presenting to PT with R knee pain. Pt states she began to experience discomfort in summer of 2023 with no GRAHAM; around 11/2023 this discomfort turned to pain. Her pain has shifted around her knee; states in the past month it has been moreso on the inside. She states it hurts with sitting, standing, and laying; the pain doesn't get worse or better with rest or exercise. She is able to sleep through the night. No numbness/tingling. Pt has tried ice/heat; takes ibuprofen and a topical NSAID for the pain. Pt had an x-ray done that was unremarkable; is getting an MRI in 2 wks. 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 She would also like to return to her at home exercising 3-4x/wk; prem JEAN. Had a L knee scope in 2014; has no L knee pain today.        Presenting condition or subjective complaint: Right knee pain since Summer 2023  Date of onset: 06/01/23    Relevant medical history: Concussions; Mental Illness; Vision problems   Dates & types of surgery: Tonsillectomy (2008), Left hip scope (2014), Upper endoscopy (2017)    Prior diagnostic imaging/testing results: X-ray     Prior therapy history for the same diagnosis, illness or injury: No      Prior Level of Function  Transfers: Independent  Ambulation: Independent  ADL: Independent    Living Environment  Social support:     Type of home: House; Multi-level   Stairs to enter the home: Yes 5 Is there a railing: Yes   Ramp: No   Stairs inside the home: Yes 30 Is there a railing: Yes   Help at home: None  Equipment owned:       Employment: Yes Student  Hobbies/Interests: Reading, exercise    Patient goals for therapy: Have no pain    Pain assessment:  Pain present     Objective   KNEE EVALUATION  INTEGUMENTARY (edema, incisions): WNL  GAIT:  Gait Deviations: WNL  BALANCE/PROPRIOCEPTION: Single Leg Stance Eyes Open (seconds): 30 sec B  ROM:  Lumbar ROM: WNL, Hip ROM: WNL B, slight pain to inside of R knee with IR. Knee flex/ext: 0-135 B, no pain.  STRENGTH:  4/5 hip abd, IR/ER, knee flex/ext on R LE vs. 5/5 on L  SPECIAL TESTS:  - McMurrays, - ligamentous testing  FUNCTIONAL TESTS:  DL squat: no pain to 90 deg. In deeper squat or kneeling position, she has been using her L LE to push up from this position d/t some discomfort. SL squat: No pain but has a slight buckling sensation. Front step down: increased dynamic valgus on R vs. L.  PALPATION:  No tenderness   JOINT MOBILITY:  Patellar mobility WNL    Assessment & Plan   CLINICAL IMPRESSIONS  Medical Diagnosis: Acute pain of R knee    Treatment Diagnosis: R knee pain, glute/knee weakness   Impression/Assessment: Patient is a 24 year old female with R knee pain complaints.  The following significant findings have been identified: Pain, Decreased strength, Impaired muscle performance, and Decreased activity tolerance. These impairments interfere with their ability to perform recreational activities and community mobility as compared to previous level of function.     Clinical Decision Making (Complexity):  Clinical Presentation: Stable/Uncomplicated  Clinical Presentation Rationale: based on medical and personal factors listed in PT evaluation  Clinical Decision Making (Complexity): Low complexity    PLAN OF CARE  Treatment Interventions:  Modalities: Cryotherapy, E-stim  Interventions: Gait Training, Manual Therapy, Neuromuscular Re-education, Therapeutic Activity, Therapeutic Exercise    Long Term Goals     PT Goal 1  Goal Identifier: SL Squat  Goal Description: Pt will perform 15 SL squats to bench without dynamic valgus or pain  Rationale: to maximize safety and independence with performance of ADLs and  functional tasks  Goal Progress: Dynamic valgus present with SL activities such as squat and step down  Target Date: 04/19/24      Frequency of Treatment: 1x every other week  Duration of Treatment: 8 wks    Recommended Referrals to Other Professionals:  none  Education Assessment:   Learner/Method: Patient;Demonstration;Pictures/Video    Risks and benefits of evaluation/treatment have been explained.   Patient/Family/caregiver agrees with Plan of Care.     Evaluation Time:     PT Eval, Low Complexity Minutes (44049): 25     Signing Clinician: CHETNA SAM PT

## 2024-03-07 ENCOUNTER — THERAPY VISIT (OUTPATIENT)
Dept: PHYSICAL THERAPY | Facility: CLINIC | Age: 24
End: 2024-03-07
Payer: COMMERCIAL

## 2024-03-07 DIAGNOSIS — M25.561 ACUTE PAIN OF RIGHT KNEE: Primary | ICD-10-CM

## 2024-03-07 PROCEDURE — 97110 THERAPEUTIC EXERCISES: CPT | Mod: GP

## 2024-03-07 PROCEDURE — 97530 THERAPEUTIC ACTIVITIES: CPT | Mod: GP

## 2024-03-10 ENCOUNTER — HEALTH MAINTENANCE LETTER (OUTPATIENT)
Age: 24
End: 2024-03-10

## 2024-03-22 ENCOUNTER — THERAPY VISIT (OUTPATIENT)
Dept: PHYSICAL THERAPY | Facility: CLINIC | Age: 24
End: 2024-03-22
Payer: COMMERCIAL

## 2024-03-22 DIAGNOSIS — M25.561 ACUTE PAIN OF RIGHT KNEE: Primary | ICD-10-CM

## 2024-03-22 PROCEDURE — 97110 THERAPEUTIC EXERCISES: CPT | Mod: GP

## 2024-03-22 PROCEDURE — 97530 THERAPEUTIC ACTIVITIES: CPT | Mod: GP

## 2024-04-05 ENCOUNTER — THERAPY VISIT (OUTPATIENT)
Dept: PHYSICAL THERAPY | Facility: CLINIC | Age: 24
End: 2024-04-05
Payer: COMMERCIAL

## 2024-04-05 DIAGNOSIS — M25.561 ACUTE PAIN OF RIGHT KNEE: Primary | ICD-10-CM

## 2024-04-05 PROCEDURE — 97530 THERAPEUTIC ACTIVITIES: CPT | Mod: GP

## 2024-04-19 ENCOUNTER — THERAPY VISIT (OUTPATIENT)
Dept: PHYSICAL THERAPY | Facility: CLINIC | Age: 24
End: 2024-04-19
Payer: COMMERCIAL

## 2024-04-19 DIAGNOSIS — M25.561 ACUTE PAIN OF RIGHT KNEE: Primary | ICD-10-CM

## 2024-04-19 PROCEDURE — 97530 THERAPEUTIC ACTIVITIES: CPT | Mod: GP

## 2024-04-19 PROCEDURE — 97110 THERAPEUTIC EXERCISES: CPT | Mod: GP

## 2024-05-01 ENCOUNTER — THERAPY VISIT (OUTPATIENT)
Dept: PHYSICAL THERAPY | Facility: CLINIC | Age: 24
End: 2024-05-01
Payer: COMMERCIAL

## 2024-05-01 DIAGNOSIS — M25.561 ACUTE PAIN OF RIGHT KNEE: Primary | ICD-10-CM

## 2024-05-01 PROCEDURE — 97110 THERAPEUTIC EXERCISES: CPT | Mod: GP

## 2024-05-01 PROCEDURE — 97530 THERAPEUTIC ACTIVITIES: CPT | Mod: GP

## 2024-05-01 ASSESSMENT — ACTIVITIES OF DAILY LIVING (ADL)
LIMPING: I DO NOT HAVE THE SYMPTOM
HOW_WOULD_YOU_RATE_THE_OVERALL_FUNCTION_OF_YOUR_KNEE_DURING_YOUR_USUAL_DAILY_ACTIVITIES?: NEARLY NORMAL
STAND: ACTIVITY IS NOT DIFFICULT
HOW_WOULD_YOU_RATE_THE_CURRENT_FUNCTION_OF_YOUR_KNEE_DURING_YOUR_USUAL_DAILY_ACTIVITIES_ON_A_SCALE_FROM_0_TO_100_WITH_100_BEING_YOUR_LEVEL_OF_KNEE_FUNCTION_PRIOR_TO_YOUR_INJURY_AND_0_BEING_THE_INABILITY_TO_PERFORM_ANY_OF_YOUR_USUAL_DAILY_ACTIVITIES?: 70
GIVING WAY, BUCKLING OR SHIFTING OF KNEE: THE SYMPTOM AFFECTS MY ACTIVITY MODERATELY
GO DOWN STAIRS: ACTIVITY IS MINIMALLY DIFFICULT
SWELLING: I DO NOT HAVE THE SYMPTOM
GO UP STAIRS: ACTIVITY IS SOMEWHAT DIFFICULT
KNEEL ON THE FRONT OF YOUR KNEE: ACTIVITY IS FAIRLY DIFFICULT
KNEE_ACTIVITY_OF_DAILY_LIVING_SUM: 53
STIFFNESS: THE SYMPTOM AFFECTS MY ACTIVITY SLIGHTLY
AS_A_RESULT_OF_YOUR_KNEE_INJURY,_HOW_WOULD_YOU_RATE_YOUR_CURRENT_LEVEL_OF_DAILY_ACTIVITY?: NEARLY NORMAL
RISE FROM A CHAIR: ACTIVITY IS MINIMALLY DIFFICULT
SQUAT: ACTIVITY IS MINIMALLY DIFFICULT
RAW_SCORE: 53
SIT WITH YOUR KNEE BENT: ACTIVITY IS SOMEWHAT DIFFICULT
PAIN: THE SYMPTOM AFFECTS MY ACTIVITY SLIGHTLY
WALK: ACTIVITY IS NOT DIFFICULT
KNEE_ACTIVITY_OF_DAILY_LIVING_SCORE: 75.71
WEAKNESS: I DO NOT HAVE THE SYMPTOM

## 2024-05-01 NOTE — PROGRESS NOTES
"     05/01/24 0500   Appointment Info   Signing clinician's name / credentials Davion Cuellar DPT   Total/Authorized Visits 8 (per PT)   Visits Used 6   Medical Diagnosis Acute pain of R knee   PT Tx Diagnosis R knee pain, glute/knee weakness   Progress Note/Certification   Onset of illness/injury or Date of Surgery 06/01/23   Therapy Frequency 1x every other week   Predicted Duration 8 wks   PT Goal 1   Goal Identifier SL Squat   Goal Description Pt will perform 15 SL squats to bench without dynamic valgus or pain   Rationale to maximize safety and independence with performance of ADLs and functional tasks   Goal Progress Performs SL squat to 20\" box with support from other leg without pain   Target Date 04/19/24   Subjective Report   Subjective Report Pt states knee is still doing well, much better than it has been. Still having pain and discomfort but more manageable. Still not interested in trying any running activities.   Therapeutic Procedure/Exercise   Therapeutic Procedures: strength, endurance, ROM, flexibility minutes (96917) 10   Ther Proc 1 DL leg press   Ther Proc 1 - Details 90# x12 , 105# x12 , 120# x12   Ther Proc 3 Hamstring bridge with ball   Ther Proc 3 - Details DL , 3x10   Skilled Intervention manual/verbal cueing   Patient Response/Progress No pain, fatigues on leg press sufficiently around 120#   Therapeutic Activity   Therapeutic Activities: dynamic activities to improve functional performance minutes (55419) 25   Ther Act 1 Discussion regarding status, POC, testing. Exercise programming, selection and cardio options to do in gym. education regarding ndicators of needing more PT or surgery in the future.   Ther Act 3 Step up 16\"   Ther Act 3 - Details 3x8 ea   Ther Act 4 SL squat to 20\" box with kickstand   Ther Act 4 - Details 3x8 ea   Skilled Intervention verbal cueing/education   Patient Response/Progress No increase in pain, slight discomfort with increased knee flexion angle   Education "   Learner/Method Patient;Demonstration;Pictures/Video   Plan   Home program PTRX   Plan for next session discharged   Total Session Time   Timed Code Treatment Minutes 35   Total Treatment Time (sum of timed and untimed services) 35         DISCHARGE  Reason for Discharge: Pt has progressed well during PT. She continues to experience some pain and discomfort medially in her R knee but it is more tolerable and manageable since starting her home exercise program. She has adhered well to her program and has improved her functional strength; she will continue to work on general LE strengthening at home, and perform cardio activity such as walking, swimming, and stationary biking. She will be leaving for [place] this summer, and will follow up with injections, PT, and or surgical consultation as necessary.     Equipment Issued: none    Discharge Plan: Patient to continue home program.    Referring Provider:  Nenita Bradford

## 2024-06-24 PROBLEM — M25.561 ACUTE PAIN OF RIGHT KNEE: Status: RESOLVED | Noted: 2024-02-23 | Resolved: 2024-06-24

## 2024-06-24 NOTE — PROGRESS NOTES
Patient has not returned since informally ending PT; her chart was left open in case she wished to return before her move to [place]. did not return to physical therapy and was lost follow up.  Current status of patient is unknown at this point.  Please see last therapy session for last reported subjective and/or objective information.  Patient is formally discharged from physical therapy.

## 2025-03-16 ENCOUNTER — HEALTH MAINTENANCE LETTER (OUTPATIENT)
Age: 25
End: 2025-03-16